# Patient Record
Sex: FEMALE | Race: WHITE | HISPANIC OR LATINO | URBAN - NONMETROPOLITAN AREA
[De-identification: names, ages, dates, MRNs, and addresses within clinical notes are randomized per-mention and may not be internally consistent; named-entity substitution may affect disease eponyms.]

---

## 2021-10-19 ENCOUNTER — OFFICE VISIT (OUTPATIENT)
Dept: URBAN - NONMETROPOLITAN AREA CLINIC 18 | Facility: CLINIC | Age: 56
End: 2021-10-19
Payer: MEDICAID

## 2021-10-19 DIAGNOSIS — H25.813 COMBINED FORMS OF AGE-RELATED CATARACT, BILATERAL: Primary | ICD-10-CM

## 2021-10-19 PROCEDURE — 99203 OFFICE O/P NEW LOW 30 MIN: CPT | Performed by: OPTOMETRIST

## 2021-10-19 ASSESSMENT — VISUAL ACUITY
OD: CF 1FT
OS: 20/200

## 2021-10-19 ASSESSMENT — INTRAOCULAR PRESSURE
OD: 15
OS: 15

## 2021-10-19 NOTE — IMPRESSION/PLAN
Impression: Combined forms of age-related cataract, bilateral: H25.813. Plan: Patient educated to current status and reason for decreased acuity coming from cataract formation. Patient experiencing symptoms of visual decrease that is effecting quality of life. Reviewed potential risk, benefits and alternatives of cataract extraction procedure and recovery. Patient understands changing glasses will not improve vision. Patient desires to have surgery, recommend phacoemulsification with intraocular lens. OCT macula today, low quality, gross views flat OU. Patient is aware of macular status and that vision post operatively cannot be determined. Patient to meet with surgical counselor to review surgical and lens options, a brief introduction to various IOL options was discussed today. Visual function form to be completed by surgical counselor. Associated risk level, surgical orders, and final lens selection at discretion of operating surgeon. Proceed with cataract extraction, recommend OD then OS. Consider immersion, at discretion of surgeon.

## 2021-11-23 ENCOUNTER — SURGERY (OUTPATIENT)
Dept: URBAN - METROPOLITAN AREA SURGERY 56 | Facility: SURGERY | Age: 56
End: 2021-11-23
Payer: MEDICAID

## 2021-11-23 DIAGNOSIS — H25.13 AGE-RELATED NUCLEAR CATARACT, BILATERAL: Primary | ICD-10-CM

## 2021-11-23 PROCEDURE — 66984 XCAPSL CTRC RMVL W/O ECP: CPT | Performed by: OPHTHALMOLOGY

## 2021-11-24 ENCOUNTER — POST-OPERATIVE VISIT (OUTPATIENT)
Dept: URBAN - NONMETROPOLITAN AREA CLINIC 18 | Facility: CLINIC | Age: 56
End: 2021-11-24
Payer: MEDICAID

## 2021-11-24 DIAGNOSIS — Z48.810 ENCOUNTER FOR SURGICAL AFTERCARE FOLLOWING SURGERY ON A SENSE ORGAN: Primary | ICD-10-CM

## 2021-11-24 PROCEDURE — 99024 POSTOP FOLLOW-UP VISIT: CPT | Performed by: OPTOMETRIST

## 2021-11-24 NOTE — IMPRESSION/PLAN
Impression: S/P Cataract Extraction by phacoemulsification with IOL placement OD - 1 Day. Encounter for surgical aftercare following surgery on a sense organ  Z48.810. Post operative instructions reviewed - Plan: Advised no heavy lifting or strenuous activity for at
least one week and no swimming for at least three weeks. Use eye shield at night for one week. Patient advised to call immediately for any
problems including, but not limited to, pain, redness, increase in floaters, flashing
lights, changes in peripheral vision, decreased vision, and/or any other problems
or concerns. Patient stated an understanding of all the instructions. Follow-up in
approximately one week / prn --Advised patient to use artificial tears for comfort.

## 2021-12-01 ENCOUNTER — POST-OPERATIVE VISIT (OUTPATIENT)
Dept: URBAN - NONMETROPOLITAN AREA CLINIC 18 | Facility: CLINIC | Age: 56
End: 2021-12-01
Payer: MEDICAID

## 2021-12-01 PROCEDURE — 99024 POSTOP FOLLOW-UP VISIT: CPT | Performed by: OPTOMETRIST

## 2021-12-01 ASSESSMENT — VISUAL ACUITY
OD: 20/20
OS: 20/60+2

## 2021-12-01 ASSESSMENT — INTRAOCULAR PRESSURE
OS: 18
OD: 18

## 2021-12-01 NOTE — IMPRESSION/PLAN
Impression: S/P Cataract Extraction by phacoemulsification with IOL placement OD - 8 Days. Encounter for surgical aftercare following surgery on a sense organ  Z48.810. Plan: Discussed status with patient. Healing well. Patient would like surgery OS, already scheduled. --Advised patient to use artificial tears for comfort.

## 2021-12-07 ENCOUNTER — SURGERY (OUTPATIENT)
Dept: URBAN - METROPOLITAN AREA SURGERY 56 | Facility: SURGERY | Age: 56
End: 2021-12-07
Payer: MEDICAID

## 2021-12-07 PROCEDURE — 66984 XCAPSL CTRC RMVL W/O ECP: CPT | Performed by: OPHTHALMOLOGY

## 2021-12-08 ENCOUNTER — POST-OPERATIVE VISIT (OUTPATIENT)
Dept: URBAN - NONMETROPOLITAN AREA CLINIC 18 | Facility: CLINIC | Age: 56
End: 2021-12-08
Payer: MEDICAID

## 2021-12-08 DIAGNOSIS — Z96.1 PRESENCE OF INTRAOCULAR LENS: Primary | ICD-10-CM

## 2021-12-08 PROCEDURE — 99024 POSTOP FOLLOW-UP VISIT: CPT | Performed by: OPTOMETRIST

## 2021-12-08 ASSESSMENT — INTRAOCULAR PRESSURE: OS: 12

## 2021-12-08 NOTE — IMPRESSION/PLAN
Impression: S/P Cataract Extraction by phacoemulsification with IOL placement OS - 1 Day. Presence of intraocular lens  Z96.1. Post operative instructions reviewed - Plan: Advised no heavy lifting or strenuous activity for at
least one week and no swimming for at least three weeks. Use eye shield at night for one week. Patient advised to call immediately for any
problems including, but not limited to, pain, redness, increase in floaters, flashing
lights, changes in peripheral vision, decreased vision, and/or any other problems
or concerns. Patient stated an understanding of all the instructions. Follow-up in
approximately one week / prn --Advised patient to use artificial tears for comfort.

## 2021-12-15 ENCOUNTER — POST-OPERATIVE VISIT (OUTPATIENT)
Dept: URBAN - NONMETROPOLITAN AREA CLINIC 18 | Facility: CLINIC | Age: 56
End: 2021-12-15
Payer: MEDICAID

## 2021-12-15 PROCEDURE — 99024 POSTOP FOLLOW-UP VISIT: CPT | Performed by: OPTOMETRIST

## 2021-12-15 ASSESSMENT — INTRAOCULAR PRESSURE
OS: 13
OD: 11

## 2021-12-15 ASSESSMENT — VISUAL ACUITY
OD: 20/20-2
OS: 20/20